# Patient Record
Sex: MALE | Race: WHITE | NOT HISPANIC OR LATINO | Employment: FULL TIME | URBAN - NONMETROPOLITAN AREA
[De-identification: names, ages, dates, MRNs, and addresses within clinical notes are randomized per-mention and may not be internally consistent; named-entity substitution may affect disease eponyms.]

---

## 2023-11-03 RX ORDER — PANTOPRAZOLE SODIUM 40 MG/1
40 TABLET, DELAYED RELEASE ORAL
COMMUNITY
End: 2023-11-06 | Stop reason: SDUPTHER

## 2023-11-03 RX ORDER — ATENOLOL 50 MG/1
50 TABLET ORAL DAILY
COMMUNITY

## 2023-11-03 RX ORDER — CALCIUM CARBONATE 300MG(750)
400 TABLET,CHEWABLE ORAL 2 TIMES DAILY
COMMUNITY
End: 2023-11-06 | Stop reason: SDUPTHER

## 2023-11-06 ENCOUNTER — OFFICE VISIT (OUTPATIENT)
Dept: CARDIOLOGY | Facility: CLINIC | Age: 23
End: 2023-11-06
Payer: COMMERCIAL

## 2023-11-06 VITALS
WEIGHT: 246 LBS | DIASTOLIC BLOOD PRESSURE: 82 MMHG | BODY MASS INDEX: 35.22 KG/M2 | HEART RATE: 60 BPM | HEIGHT: 70 IN | SYSTOLIC BLOOD PRESSURE: 128 MMHG

## 2023-11-06 DIAGNOSIS — R00.2 PALPITATIONS: Primary | ICD-10-CM

## 2023-11-06 PROCEDURE — 99213 OFFICE O/P EST LOW 20 MIN: CPT | Performed by: INTERNAL MEDICINE

## 2023-11-06 RX ORDER — CALCIUM CARBONATE 300MG(750)
400 TABLET,CHEWABLE ORAL 2 TIMES DAILY
Qty: 180 TABLET | Refills: 3 | Status: SHIPPED | OUTPATIENT
Start: 2023-11-06 | End: 2024-11-05

## 2023-11-06 RX ORDER — CETIRIZINE HYDROCHLORIDE 10 MG/1
10 TABLET, CHEWABLE ORAL DAILY
COMMUNITY

## 2023-11-06 RX ORDER — PANTOPRAZOLE SODIUM 40 MG/1
40 TABLET, DELAYED RELEASE ORAL
Qty: 90 TABLET | Refills: 3 | Status: SHIPPED | OUTPATIENT
Start: 2023-11-06 | End: 2024-11-05

## 2023-11-06 NOTE — PROGRESS NOTES
This is a 6 month follow-up.    Subjective :   Interval review of systems is negative for chest discomfort pressure tightness heaviness palpitations lightheadedness orthopnea paroxysmal nocturnal dyspnea dependent edema or claudication TIA or CVA type symptoms or bleeding diathesis    Occasional gassy anterior chest pain, not angina  BMI is excessive and we discussed.  Patient says he is just lazy.    History so Far :  1. Palpitations  2. Hypokalemia,more recent labs are awaited.  3. Anxiety  4. GERD on prophylaxis.  5. Treadmill stress test-March 2023-submaximal exercise test without cardiac symptoms or arrhythmia, patient only achieved 75% of his age-predicted maximum heart rate. The chronotropic lung blunting was most likely related to atenolol.  6. Echocardiogram April 2023-LVEF 65% grossly normal valves, PA pressure not estimated no pericardial effusion inferior vena cava appeared to be of normal size left atrial diameter 3.9 cm RV systolic pressure 18 mmHg  7. 30-day event monitor-11 patient triggered episodes and 3 auto triggered episodes, sinus isolated supraventricular premature beats and profound sinus arrhythmia with minimum heart rate of 37 bpm. Heightened vagal tone probable    Objective      Wt Readings from Last 3 Encounters:   11/06/23 112 kg (246 lb)   04/24/23 108 kg (239 lb)   02/23/23 105 kg (232 lb)            Physical Exam:    GENERAL APPEARANCE: in no acute distress.  CHEST: Symmetric and non-tender.  INTEGUMENT: Skin warm and dry  HEENT: No gross abnormalities identified.No pallor or scleral icterus.  NECK: Supple, no JVD, no bruit.   NEURO/PSHCY: Alert and oriented x3; appropriate behavior and responses and responses  LUNGS: Clear to auscultation bilaterally; normal respiratory effort.  HEART: Rate and rhythm regular with no evident murmur; no gallop appreciated.   ABDOMEN: Soft, non tender.  MUSCULOSKELETAL: No gross deformities.  EXTREMITIES: Warm  There is no edema  noted.    Meds:  Current Outpatient Medications   Medication Instructions    atenolol (TENORMIN) 50 mg, oral, Daily    cetirizine (ZYRTEC) 10 mg, oral, Daily    magnesium oxide (MAG-OX) 400 mg, oral, 2 times daily    pantoprazole (PROTONIX) 40 mg, oral, Daily before breakfast, Do not crush, chew, or split.      Basic metabolic profile from April 2023 showed sodium of 143 potassium 4.1 GFR greater than 60 glucose 85 magnesium 2.1    No Known Allergies      Problem List:    Patient Active Problem List    Diagnosis Date Noted    Palpitations 11/06/2023    Treadmill stress test-March 2023-submaximal exercise test without cardiac symptoms or arrhythmia, patient only achieved 75% of his age-predicted maximum heart rate. The chronotropic lung blunting was most likely related to atenolol.   Echocardiogram April 2023-LVEF 65% grossly normal valves, PA pressure not estimated no pericardial effusion inferior vena cava appeared to be of normal size left atrial diameter 3.9 cm RV systolic pressure 18 mmHg  30-day event monitor-11 patient triggered episodes and 3 auto triggered episodes, sinus isolated supraventricular premature beats and profound sinus arrhythmia with minimum heart rate of 37 bpm. Heightened vagal tone probable              Assessment:     Patient is doing well on a combination of metoprolol and GI prophylaxis and magnesium oxide    Recommendations:  1.  Heart healthy lifestyle choices were encouraged  2.  I am not comfortable with long-term prescription for GI prophylaxis, will defer to primary  3.  I refilled the metoprolol and magnesium.  4.  Patient to see me on an as-needed basis, additional medication refills will be directed by Dr. Smith.        Follow up : monica Cruz MD

## 2023-11-06 NOTE — PATIENT INSTRUCTIONS
Please bring all medicines, vitamins, and herbal supplements with you when you come to the office.    Prescriptions will not be filled unless you are compliant with your follow up appointments or have a follow up appointment scheduled as per instruction of your physician. Refills should be requested at the time of your visit.     Patient to follow up on an as needed basis.

## 2024-02-12 DIAGNOSIS — R00.2 PALPITATIONS: ICD-10-CM

## 2024-02-12 RX ORDER — ATENOLOL 50 MG/1
50 TABLET ORAL DAILY
Qty: 90 TABLET | Refills: 3 | OUTPATIENT
Start: 2024-02-12

## 2024-02-26 RX ORDER — ATENOLOL 50 MG/1
50 TABLET ORAL DAILY
Qty: 90 TABLET | Refills: 0 | OUTPATIENT
Start: 2024-02-26